# Patient Record
Sex: FEMALE | Race: WHITE | Employment: FULL TIME | URBAN - METROPOLITAN AREA
[De-identification: names, ages, dates, MRNs, and addresses within clinical notes are randomized per-mention and may not be internally consistent; named-entity substitution may affect disease eponyms.]

---

## 2017-01-10 RX ORDER — ASPIRIN 81 MG/1
81 TABLET ORAL DAILY
Qty: 90 TABLET | Refills: 3 | Status: SHIPPED | OUTPATIENT
Start: 2017-01-10 | End: 2017-11-07 | Stop reason: SDUPTHER

## 2017-03-14 ENCOUNTER — PROCEDURE VISIT (OUTPATIENT)
Dept: CARDIOLOGY CLINIC | Age: 62
End: 2017-03-14

## 2017-03-14 DIAGNOSIS — R55 SYNCOPE, UNSPECIFIED SYNCOPE TYPE: Primary | ICD-10-CM

## 2017-03-14 DIAGNOSIS — Z45.09 ENCOUNTER FOR ELECTRONIC ANALYSIS OF REVEAL EVENT RECORDER: ICD-10-CM

## 2017-03-14 PROCEDURE — 93298 REM INTERROG DEV EVAL SCRMS: CPT | Performed by: INTERNAL MEDICINE

## 2017-03-14 PROCEDURE — 93299 PR REM INTERROG ICPMS/SCRMS <30 D TECH REVIEW: CPT | Performed by: INTERNAL MEDICINE

## 2017-04-27 ENCOUNTER — PROCEDURE VISIT (OUTPATIENT)
Dept: CARDIOLOGY CLINIC | Age: 62
End: 2017-04-27

## 2017-04-27 DIAGNOSIS — R55 SYNCOPE, UNSPECIFIED SYNCOPE TYPE: Primary | ICD-10-CM

## 2017-04-27 DIAGNOSIS — Z45.09 ENCOUNTER FOR ELECTRONIC ANALYSIS OF REVEAL EVENT RECORDER: ICD-10-CM

## 2017-04-27 PROCEDURE — 93299 PR REM INTERROG ICPMS/SCRMS <30 D TECH REVIEW: CPT | Performed by: INTERNAL MEDICINE

## 2017-04-27 PROCEDURE — 93298 REM INTERROG DEV EVAL SCRMS: CPT | Performed by: INTERNAL MEDICINE

## 2017-06-12 ENCOUNTER — PROCEDURE VISIT (OUTPATIENT)
Dept: CARDIOLOGY CLINIC | Age: 62
End: 2017-06-12

## 2017-06-12 DIAGNOSIS — Z45.09 ENCOUNTER FOR ELECTRONIC ANALYSIS OF REVEAL EVENT RECORDER: ICD-10-CM

## 2017-06-12 DIAGNOSIS — R55 SYNCOPE, UNSPECIFIED SYNCOPE TYPE: Primary | ICD-10-CM

## 2017-06-12 PROCEDURE — 93299 PR REM INTERROG ICPMS/SCRMS <30 D TECH REVIEW: CPT | Performed by: INTERNAL MEDICINE

## 2017-06-12 PROCEDURE — 93298 REM INTERROG DEV EVAL SCRMS: CPT | Performed by: INTERNAL MEDICINE

## 2017-07-26 ENCOUNTER — PROCEDURE VISIT (OUTPATIENT)
Dept: CARDIOLOGY CLINIC | Age: 62
End: 2017-07-26

## 2017-07-26 DIAGNOSIS — Z45.09 ENCOUNTER FOR ELECTRONIC ANALYSIS OF REVEAL EVENT RECORDER: ICD-10-CM

## 2017-07-26 DIAGNOSIS — R55 SYNCOPE, UNSPECIFIED SYNCOPE TYPE: Primary | ICD-10-CM

## 2017-07-26 PROCEDURE — 93298 REM INTERROG DEV EVAL SCRMS: CPT | Performed by: INTERNAL MEDICINE

## 2017-07-26 PROCEDURE — 93299 PR REM INTERROG ICPMS/SCRMS <30 D TECH REVIEW: CPT | Performed by: INTERNAL MEDICINE

## 2017-07-27 ENCOUNTER — TELEPHONE (OUTPATIENT)
Dept: CARDIOLOGY CLINIC | Age: 62
End: 2017-07-27

## 2017-11-07 ENCOUNTER — OFFICE VISIT (OUTPATIENT)
Dept: CARDIOLOGY CLINIC | Age: 62
End: 2017-11-07

## 2017-11-07 VITALS
DIASTOLIC BLOOD PRESSURE: 80 MMHG | SYSTOLIC BLOOD PRESSURE: 100 MMHG | HEART RATE: 72 BPM | BODY MASS INDEX: 25.06 KG/M2 | HEIGHT: 65 IN | WEIGHT: 150.4 LBS

## 2017-11-07 DIAGNOSIS — R00.0 TACHYARRHYTHMIA: Primary | ICD-10-CM

## 2017-11-07 PROCEDURE — 99213 OFFICE O/P EST LOW 20 MIN: CPT | Performed by: INTERNAL MEDICINE

## 2017-11-07 RX ORDER — ASPIRIN 81 MG/1
81 TABLET ORAL DAILY
Qty: 90 TABLET | Refills: 3 | Status: SHIPPED | OUTPATIENT
Start: 2017-11-07 | End: 2018-10-31 | Stop reason: SDUPTHER

## 2017-11-07 RX ORDER — CELECOXIB 100 MG/1
100 CAPSULE ORAL 2 TIMES DAILY
COMMUNITY
End: 2019-11-15

## 2017-11-07 RX ORDER — MIDODRINE HYDROCHLORIDE 2.5 MG/1
2.5 TABLET ORAL 3 TIMES DAILY
Qty: 90 TABLET | Refills: 3 | Status: SHIPPED | OUTPATIENT
Start: 2017-11-07 | End: 2018-03-03 | Stop reason: SDUPTHER

## 2017-11-07 NOTE — PROGRESS NOTES
CARDIOLOGY NOTE      11/7/2017    RE: Sebastian Centeno  (1955)                               TO:    No primary care provider on file. Thank you for involving me in taking care of your  patient Sebastian Centeno, who is a  65y.o. year old      female with past medical  history of  arrythmia  is  seen today Patient  during this  visit has no cardiac complains. Vitals:    11/07/17 1328   BP: 100/80   Pulse: 72       Current Outpatient Prescriptions   Medication Sig Dispense Refill    celecoxib (CELEBREX) 100 MG capsule Take 100 mg by mouth 2 times daily      aspirin 81 MG EC tablet Take 1 tablet by mouth daily 90 tablet 3    midodrine (PROAMATINE) 2.5 MG tablet Take 1 tablet by mouth 3 times daily 90 tablet 3    ondansetron (ZOFRAN) 8 MG tablet TAKE 1 TABLET BY MOUTH EVERY 8 HOURS AS NEEDED  2    RESTASIS 0.05 % ophthalmic emulsion   3    estradiol (ESTRACE) 2 MG tablet Take 1 tablet by mouth daily. 90 tablet 3    zolpidem (AMBIEN) 10 MG tablet 5 mg nightly as needed.  Omega-3 Fatty Acids (OMEGA 3 PO) Take  by mouth.  rizatriptan (MAXALT) 10 MG tablet Take 10 mg by mouth once as needed. May repeat in 2 hours if needed       Multiple Vitamins-Minerals (CENTRUM PO) Take  by mouth.  vitamin E 1000 UNIT capsule Take 1,000 Units by mouth daily. No current facility-administered medications for this visit. Allergies: Review of patient's allergies indicates no known allergies. Past Medical History:   Diagnosis Date    ACS (acute coronary syndrome) (HCC)     Chest pain     Dizziness     H/O 24 hour EKG monitoring 11/26/2013    14 DAY EVENT MONITOR-Normal sinus rhythm    H/O cardiac catheterization 11/12/2013    no significant disease, continue risk modification.  H/O cardiovascular stress test 11/11/2013    EF 70%, mild degree of inferior wall ischemia, normal perfuison, normal LV systolic function.     H/O chest pain 11/10/13    H/O Doppler ultrasound  No respiratory distress. No wheezes or rales. Pulses: Bilateral radial and pedal pulses normal  Abdomen  no tenderness  Musculoskeletal  normal strength  Neurologic    There are  no gross focal neurologic abnormalities.   Skin-  No rash  Affect; normal mood    DATA:  Lab Results   Component Value Date    TROPONINI <0.006 11/11/2013     BNP:  No results found for: BNP  PT/INR:  No results found for: PTINR  No results found for: LABA1C  No results found for: CHOL, TRIG, HDL, LDLCALC, LDLDIRECT  Lab Results   Component Value Date    ALT 14 11/10/2013    AST 18 11/10/2013     TSH:  No results found for: TSH      QUALITY MEASURES:  CAD:  No   CHOL LOWERING:  No- if No Why  ANTIPLATELET:  No - if No why  BETA BLOCKER    no  IF  NO WHY  SMOKING HISTORY no COUNSELLED no  ATRIAL FIBRILLATIONno ANTICOAG: no    Assessment/ Plan:     Patient seen , interviewed and examined      - arrythmia  Sp ablation better    - SP  ILR  FU with EP    - hypotension on proamatine

## 2017-11-14 ENCOUNTER — TELEPHONE (OUTPATIENT)
Dept: CARDIOLOGY CLINIC | Age: 62
End: 2017-11-14

## 2017-11-14 NOTE — TELEPHONE ENCOUNTER
Spoke w/ patient scheduled her with Dr Chris Chahal per Dr Eliezer Anguiano for 12/6 @ 3 pm. Patient is out of town and if that date & time doesn't work she will call back next week.

## 2017-12-06 ENCOUNTER — INITIAL CONSULT (OUTPATIENT)
Dept: CARDIOLOGY CLINIC | Age: 62
End: 2017-12-06

## 2017-12-06 VITALS
SYSTOLIC BLOOD PRESSURE: 114 MMHG | HEIGHT: 65 IN | BODY MASS INDEX: 24.96 KG/M2 | WEIGHT: 149.8 LBS | HEART RATE: 72 BPM | OXYGEN SATURATION: 98 % | DIASTOLIC BLOOD PRESSURE: 74 MMHG

## 2017-12-06 DIAGNOSIS — R00.0 TACHYARRHYTHMIA: ICD-10-CM

## 2017-12-06 DIAGNOSIS — R00.2 PALPITATIONS: Primary | ICD-10-CM

## 2017-12-06 PROCEDURE — 99214 OFFICE O/P EST MOD 30 MIN: CPT | Performed by: INTERNAL MEDICINE

## 2017-12-06 PROCEDURE — 93000 ELECTROCARDIOGRAM COMPLETE: CPT | Performed by: INTERNAL MEDICINE

## 2017-12-06 RX ORDER — GABAPENTIN 300 MG/1
300 CAPSULE ORAL 3 TIMES DAILY
COMMUNITY

## 2017-12-06 NOTE — LETTER
[facility]) Informed Consent for Anesthesia/Sedation, Surgery, Invasive Procedures, and other High-risk Interventions and Medication use     *This consent is applicable for 30 days following patient signature*    Procedure(s)   Arianne LEE authorize, Dr. Sharan Aguiar   and the associate(s) or assistant(s) of his/her choice, to perform the following procedure(s):Electrophysiology Study/Supraventricular Tachycardia Ablation with Loop Removal possible Loop Replacement. I know that unexpected conditions may require additional or different procedures than those above. I authorize the above named practitioner(s) perform these as necessary and desirable. This is based on the practitioners professional judgment. The above named practitioner has discussed the above procedure(s) with me, including:  ? Potential benefits, including likelihood of success of the procedure(s) goals  ? Risks  ? Side effects, risk of death, and risk of infection  ? Any potential problems that might occur during recuperation or healing post-procedure  ? Reasonable alternatives  ? Risks of NOT performing the procedure(s)    I acknowledge that no warranty or guarantee has been made to the results the procedure(s). I consent to the above named practitioner(s) providing additional services to me as deemed reasonable and necessary, including but not limited to:    ? Use of medications for anesthesia or sedation. ? All anesthesia and sedation carry risks. My practitioner has discussed my anticipated anesthesia and/or sedation and the risks of using, risk of not using, benefits, side effects, and alternatives. ? Use of pathology  ? I authorize [facility]) to dispose of tissues, specimens or organs when pathology is complete. ? Use of radiology  ? A contrast agent may be required for radiology procedures.   My practitioner has advised me of the risks of using, risks of not using, 11/10/16 153 lb (69.4 kg)        Insurance: Payor: [facility] / Plan: [facility] [place] PPO / Product Type: *No Product type* /     Date of Procedure: 12/27/17 Time: 1:00 Arrival Time: 11:00    Diagnosis:Supraventricular Tachycardia Ablation  Allergies: No Known Allergies     1) Call [facility] scheduling ([phone]) or Instant Message  CONFIRMED WITH     PHONE OR   INSTANT MESSAGE  2) PREAUTHORIZATION NUMBER:    Spoke to:      From date:     expiration date:        Salvatore Estrada

## 2017-12-06 NOTE — PROGRESS NOTES
Electrophysiology FU Note      Chief complaint :  pALPITATIONS    Referring physician:  Chayo Boudreaux      Primary care physician: No primary care provider on file. History of Present Illness: This visit (12/6/2017)      Chief Complaint   Patient presents with    Tachycardia     Ak-tachycardia. Patient denies CP, SOB, dizziness, or edema. Does report palpitations at times, but thinks it is due to coffee. She says they come and go very quickly. Patient has a loop and states she thinks it is no longer working or that the battery is dead and she states Dr Winsome Beckman wanted her to see Dr Tawana Grayson to see what he wanted to do. Previous visit (10/21/2016)      Chief Complaint   Patient presents with    Shortness of Breath     Patient states that for the last couple of months she has been having shortness of breath and feeling very fatigued    Other     patient states that her blood pressure has been low, line around 80's/50's. She says that her heartrate has been all over the place.  Other     patient states that she gets this tingling in her hands and in her head. She also states that she gets these dull headaches. Previous visit: (6/3/2016)      Chief Complaint   Patient presents with    3 Month Follow-Up     Patient is here for 3 month OV, she states that she was started on Multaq at last office visit. Pt states she is a little tired and sluggish but thinks they may be from medication. Pt denies any chest pain, palpitations, dizziness SOB or edema.  Atrial Fibrillation             Past Medical History:   Diagnosis Date    ACS (acute coronary syndrome) (HCC)     Chest pain     Dizziness     H/O 24 hour EKG monitoring 11/26/2013    14 DAY EVENT MONITOR-Normal sinus rhythm    H/O cardiac catheterization 11/12/2013    no significant disease, continue risk modification.     H/O cardiovascular stress test 11/11/2013    EF 70%, mild degree of inferior wall ischemia, normal perfuison, normal LV systolic function.  H/O chest pain 11/10/13    H/O Doppler ultrasound 3/13/2014    peripheral arterial duplex - no significant stenosis, suggest nonvascular workup    H/O echocardiogram 11/11/2013    EF 55-60%, mild mitral and tricuspid insufficiencies, normal LV systolic function, no pericardial effusion.  H/O echocardiogram 11/10/2016    EF 60% Mild Mitral and Tricuspid insuff.  H/O pulmonary function tests 11/27/2013    History of exercise stress test 11/10/2016    treadmill    Hormone disorder     Migraine     Migraines     Near syncope     Palpitations     Post-menopause     Tachyarrhythmia     Urinary incontinence        Past Surgical History:   Procedure Laterality Date    CARDIAC SURGERY      Heart cath    COLONOSCOPY      ELBOW SURGERY      EYE SURGERY      HYSTERECTOMY  1990    endometriosis    TUBAL LIGATION         Family History   Problem Relation Age of Onset    Diabetes Mother     Hypertension Mother     Heart Disease Mother     High Blood Pressure Mother     Heart Disease Sister         reports that she has never smoked. She has never used smokeless tobacco. She reports that she drinks about 0.6 oz of alcohol per week . She reports that she does not use drugs. No Known Allergies    Current Outpatient Prescriptions   Medication Sig Dispense Refill    gabapentin (NEURONTIN) 300 MG capsule Take 300 mg by mouth 3 times daily      celecoxib (CELEBREX) 100 MG capsule Take 100 mg by mouth 2 times daily      midodrine (PROAMATINE) 2.5 MG tablet Take 1 tablet by mouth 3 times daily 90 tablet 3    aspirin 81 MG EC tablet Take 1 tablet by mouth daily 90 tablet 3    ondansetron (ZOFRAN) 8 MG tablet TAKE 1 TABLET BY MOUTH EVERY 8 HOURS AS NEEDED  2    RESTASIS 0.05 % ophthalmic emulsion   3    estradiol (ESTRACE) 2 MG tablet Take 1 tablet by mouth daily.  90 tablet 3    zolpidem (AMBIEN) 10 MG tablet 5 mg nightly as needed.  Omega-3 Fatty Acids (OMEGA 3 PO) Take  by mouth.  rizatriptan (MAXALT) 10 MG tablet Take 10 mg by mouth once as needed. May repeat in 2 hours if needed       Multiple Vitamins-Minerals (CENTRUM PO) Take  by mouth.  vitamin E 1000 UNIT capsule Take 1,000 Units by mouth daily. No current facility-administered medications for this visit. Review of Systems:   Review of Systems   Constitutional:D tired and weak Negative for fever, chills and activity change. HENT: Negative for congestion, ear pain and tinnitus. Eyes: Negative for photophobia, pain and visual disturbance. Respiratory: . Negative for cough, chest tightness and wheezing. Cardiovascular: Shortness of breath. palpitations now Negative for chest pain and leg swelling. Gastrointestinal: Negative for nausea, vomiting, abdominal pain, diarrhea, constipation and blood in stool. Endocrine: Negative for cold intolerance and heat intolerance. Genitourinary: Negative for dysuria, hematuria and flank pain. Musculoskeletal: Positive for back pain and arthralgias. Negative for myalgias and neck stiffness. Skin: Negative for color change and rash. Allergic/Immunologic: Negative for food allergies. Neurological: Negative for dizziness or syncope Negative for numbness and headaches. Hematological: Does not bruise/bleed easily. Psychiatric/Behavioral: Negative for behavioral problems, confusion and agitation. Physical Examination:    /74   Pulse 72   Ht 5' 5\" (1.651 m)   Wt 149 lb 12.8 oz (67.9 kg)   SpO2 98%   BMI 24.93 kg/m²    Wt Readings from Last 3 Encounters:   12/06/17 149 lb 12.8 oz (67.9 kg)   11/07/17 150 lb 6.4 oz (68.2 kg)   11/10/16 153 lb (69.4 kg)     Body mass index is 24.93 kg/m². Physical Exam   Constitutional: She is oriented to person, place, and time and well-developed, well-nourished, and in no distress. HENT:   Head: Normocephalic and atraumatic.    Eyes: battery is dead    More palpitations last few weeks and battery is dead so we cannot see the arrhythmia        Cheryl Whitmore MD

## 2017-12-06 NOTE — PROGRESS NOTES
Subjective:      Patient ID: Frances Torres is a 65 y.o. female.     HPI    Review of Systems    Objective:   Physical Exam    Assessment:      ***      Plan:      ***

## 2017-12-07 ENCOUNTER — TELEPHONE (OUTPATIENT)
Dept: CARDIOLOGY CLINIC | Age: 62
End: 2017-12-07

## 2017-12-20 ENCOUNTER — TELEPHONE (OUTPATIENT)
Dept: CARDIOLOGY CLINIC | Age: 62
End: 2017-12-20

## 2017-12-22 ENCOUNTER — HOSPITAL ENCOUNTER (OUTPATIENT)
Dept: LAB | Age: 62
Discharge: HOME | End: 2017-12-22
Attending: INTERNAL MEDICINE | Admitting: INTERNAL MEDICINE

## 2017-12-22 DIAGNOSIS — Z01.811 PRE-OP CHEST EXAM: ICD-10-CM

## 2017-12-22 LAB
ANION GAP SERPL CALCULATED.3IONS-SCNC: 11 MMOL/L (ref 4–16)
APTT: 29.4 SECONDS (ref 21.2–33)
BUN BLDV-MCNC: 11 MG/DL (ref 6–23)
CALCIUM SERPL-MCNC: 9.4 MG/DL (ref 8.3–10.6)
CHLORIDE BLD-SCNC: 100 MMOL/L (ref 99–110)
CO2: 27 MMOL/L (ref 21–32)
CREAT SERPL-MCNC: 0.9 MG/DL (ref 0.6–1.1)
GFR AFRICAN AMERICAN: >60 ML/MIN/1.73M2
GFR NON-AFRICAN AMERICAN: >60 ML/MIN/1.73M2
GLUCOSE BLD-MCNC: 79 MG/DL (ref 70–99)
HCT VFR BLD CALC: 38.4 % (ref 37–47)
HEMOGLOBIN: 12.4 GM/DL (ref 12.5–16)
INR BLD: 0.96 INDEX
MAGNESIUM: 1.8 MG/DL (ref 1.8–2.4)
MCH RBC QN AUTO: 30.3 PG (ref 27–31)
MCHC RBC AUTO-ENTMCNC: 32.3 % (ref 32–36)
MCV RBC AUTO: 93.9 FL (ref 78–100)
PDW BLD-RTO: 13.8 % (ref 11.7–14.9)
PHOSPHORUS: 2.4 MG/DL (ref 2.5–4.9)
PLATELET # BLD: 360 K/CU MM (ref 140–440)
PMV BLD AUTO: 9.8 FL (ref 7.5–11.1)
POTASSIUM SERPL-SCNC: 4.2 MMOL/L (ref 3.5–5.1)
PROTHROMBIN TIME: 11 SECONDS (ref 9.12–12.5)
RBC # BLD: 4.09 M/CU MM (ref 4.2–5.4)
SODIUM BLD-SCNC: 138 MMOL/L (ref 135–145)
WBC # BLD: 11.3 K/CU MM (ref 4–10.5)

## 2018-01-12 ENCOUNTER — OFFICE VISIT (OUTPATIENT)
Dept: CARDIOLOGY CLINIC | Age: 63
End: 2018-01-12

## 2018-01-12 VITALS
BODY MASS INDEX: 25.12 KG/M2 | HEIGHT: 65 IN | DIASTOLIC BLOOD PRESSURE: 80 MMHG | HEART RATE: 72 BPM | WEIGHT: 150.8 LBS | SYSTOLIC BLOOD PRESSURE: 118 MMHG

## 2018-01-12 DIAGNOSIS — R00.2 PALPITATIONS: Primary | ICD-10-CM

## 2018-01-12 DIAGNOSIS — R00.0 TACHYARRHYTHMIA: ICD-10-CM

## 2018-01-12 PROCEDURE — 93000 ELECTROCARDIOGRAM COMPLETE: CPT | Performed by: INTERNAL MEDICINE

## 2018-01-12 PROCEDURE — 99212 OFFICE O/P EST SF 10 MIN: CPT | Performed by: INTERNAL MEDICINE

## 2018-01-12 NOTE — PROGRESS NOTES
Electrophysiology FU Note      Chief complaint :  pALPITATIONS    Referring physician:  Eris Man      Primary care physician: Renato Zuniga MD      History of Present Illness: This visit (1/12/2018)      Chief Complaint   Patient presents with    Atrial Fibrillation     Ak-F/U EP Study/SVT abl(not done) and loop removal 12/27. Patient CP, SOB, dizziness, or edema. Patient states loop removal site seems to be healing fine, steri strips still in place. Also reports a \"flutter\" every so often that will last for a few seconds. Previous visit: (12/6/2017)      Chief Complaint   Patient presents with    Tachycardia     Ak-tachycardia. Patient denies CP, SOB, dizziness, or edema. Does report palpitations at times, but thinks it is due to coffee. She says they come and go very quickly. Patient has a loop and states she thinks it is no longer working or that the battery is dead and she states Dr Rolin Nissen wanted her to see Dr Ellyn Lacy to see what he wanted to do. Previous visit (10/21/2016)      Chief Complaint   Patient presents with    Shortness of Breath     Patient states that for the last couple of months she has been having shortness of breath and feeling very fatigued    Other     patient states that her blood pressure has been low, line around 80's/50's. She says that her heartrate has been all over the place.  Other     patient states that she gets this tingling in her hands and in her head. She also states that she gets these dull headaches. Previous visit: (6/3/2016)      Chief Complaint   Patient presents with    3 Month Follow-Up     Patient is here for 3 month OV, she states that she was started on Multaq at last office visit. Pt states she is a little tired and sluggish but thinks they may be from medication. Pt denies any chest pain, palpitations, dizziness SOB or edema.      Atrial Fibrillation             Past Medical History:   Diagnosis Date    ACS (acute coronary syndrome) (HCC)     Chest pain     Dizziness     H/O 24 hour EKG monitoring 11/26/2013    14 DAY EVENT MONITOR-Normal sinus rhythm    H/O cardiac catheterization 11/12/2013    no significant disease, continue risk modification.  H/O cardiovascular stress test 11/11/2013    EF 70%, mild degree of inferior wall ischemia, normal perfuison, normal LV systolic function.  H/O chest pain 11/10/13    H/O Doppler ultrasound 3/13/2014    peripheral arterial duplex - no significant stenosis, suggest nonvascular workup    H/O echocardiogram 11/11/2013    EF 55-60%, mild mitral and tricuspid insufficiencies, normal LV systolic function, no pericardial effusion.  H/O echocardiogram 11/10/2016    EF 60% Mild Mitral and Tricuspid insuff.  H/O pulmonary function tests 11/27/2013    History of exercise stress test 11/10/2016    treadmill    Hormone disorder     Migraine     Migraines     Near syncope     Palpitations     Post-menopause     Tachyarrhythmia     Urinary incontinence        Past Surgical History:   Procedure Laterality Date    CARDIAC SURGERY      Heart cath    COLONOSCOPY      ELBOW SURGERY      EYE SURGERY      HYSTERECTOMY  1990    endometriosis    TUBAL LIGATION         Family History   Problem Relation Age of Onset    Diabetes Mother     Hypertension Mother     Heart Disease Mother     High Blood Pressure Mother     Heart Disease Sister         reports that she has never smoked. She has never used smokeless tobacco. She reports that she does not drink alcohol or use drugs.     No Known Allergies    Current Outpatient Prescriptions   Medication Sig Dispense Refill    benzonatate (TESSALON PERLES) 100 MG capsule Take 100 mg by mouth 3 times daily as needed for Cough      gabapentin (NEURONTIN) 300 MG capsule Take 300 mg by mouth 3 times daily      celecoxib (CELEBREX) 100 MG capsule Take 100 mg by mouth 2 times daily      midodrine  3.  AVNRT sp ablation of slow pathway    Occasional fluttering  No arrhythmia on EP study  Recommend to avoid caffeine and alcohol (commended for quit alcohol)    Loop removal site healed well  FU with Gissel Gonzalez MD

## 2018-03-06 RX ORDER — MIDODRINE HYDROCHLORIDE 2.5 MG/1
2.5 TABLET ORAL 3 TIMES DAILY
Qty: 90 TABLET | Refills: 6 | Status: SHIPPED | OUTPATIENT
Start: 2018-03-06 | End: 2018-10-31 | Stop reason: SDUPTHER

## 2018-10-31 ENCOUNTER — OFFICE VISIT (OUTPATIENT)
Dept: CARDIOLOGY CLINIC | Age: 63
End: 2018-10-31
Payer: COMMERCIAL

## 2018-10-31 VITALS
WEIGHT: 157.8 LBS | DIASTOLIC BLOOD PRESSURE: 80 MMHG | HEART RATE: 64 BPM | SYSTOLIC BLOOD PRESSURE: 110 MMHG | BODY MASS INDEX: 26.29 KG/M2 | HEIGHT: 65 IN

## 2018-10-31 DIAGNOSIS — I73.9 CLAUDICATION (HCC): ICD-10-CM

## 2018-10-31 DIAGNOSIS — Z86.79 S/P CATHETER ABLATION OF SLOW PATHWAY: ICD-10-CM

## 2018-10-31 DIAGNOSIS — Z98.890 S/P CATHETER ABLATION OF SLOW PATHWAY: ICD-10-CM

## 2018-10-31 DIAGNOSIS — R00.0 TACHYARRHYTHMIA: Primary | ICD-10-CM

## 2018-10-31 PROCEDURE — 99213 OFFICE O/P EST LOW 20 MIN: CPT | Performed by: NURSE PRACTITIONER

## 2018-10-31 RX ORDER — SIMVASTATIN 40 MG
40 TABLET ORAL NIGHTLY
COMMUNITY

## 2018-10-31 RX ORDER — ASPIRIN 81 MG/1
81 TABLET ORAL DAILY
Qty: 90 TABLET | Refills: 3 | Status: SHIPPED | OUTPATIENT
Start: 2018-10-31 | End: 2019-11-15 | Stop reason: SDUPTHER

## 2018-10-31 RX ORDER — MIDODRINE HYDROCHLORIDE 2.5 MG/1
2.5 TABLET ORAL 3 TIMES DAILY
Qty: 270 TABLET | Refills: 3 | Status: SHIPPED | OUTPATIENT
Start: 2018-10-31 | End: 2019-10-15 | Stop reason: SDUPTHER

## 2018-10-31 NOTE — PATIENT INSTRUCTIONS
Please remember to bring all medication bottles or a medication list with you to your appointment. If you have any questions, please call our office at [phone].

## 2019-08-09 ENCOUNTER — TELEPHONE (OUTPATIENT)
Dept: CARDIOLOGY CLINIC | Age: 64
End: 2019-08-09

## 2019-10-15 RX ORDER — MIDODRINE HYDROCHLORIDE 2.5 MG/1
2.5 TABLET ORAL 3 TIMES DAILY
Qty: 270 TABLET | Refills: 3 | Status: SHIPPED | OUTPATIENT
Start: 2019-10-15 | End: 2019-11-15

## 2019-11-15 ENCOUNTER — OFFICE VISIT (OUTPATIENT)
Dept: CARDIOLOGY CLINIC | Age: 64
End: 2019-11-15
Payer: COMMERCIAL

## 2019-11-15 VITALS
DIASTOLIC BLOOD PRESSURE: 82 MMHG | WEIGHT: 154.6 LBS | SYSTOLIC BLOOD PRESSURE: 112 MMHG | HEIGHT: 65 IN | HEART RATE: 80 BPM | BODY MASS INDEX: 25.76 KG/M2

## 2019-11-15 DIAGNOSIS — Z86.79 S/P CATHETER ABLATION OF SLOW PATHWAY: ICD-10-CM

## 2019-11-15 DIAGNOSIS — I24.9 ACS (ACUTE CORONARY SYNDROME) (HCC): Primary | ICD-10-CM

## 2019-11-15 DIAGNOSIS — R00.0 TACHYARRHYTHMIA: ICD-10-CM

## 2019-11-15 DIAGNOSIS — Z98.890 S/P CATHETER ABLATION OF SLOW PATHWAY: ICD-10-CM

## 2019-11-15 PROCEDURE — 93000 ELECTROCARDIOGRAM COMPLETE: CPT | Performed by: NURSE PRACTITIONER

## 2019-11-15 PROCEDURE — 99214 OFFICE O/P EST MOD 30 MIN: CPT | Performed by: NURSE PRACTITIONER

## 2019-11-15 RX ORDER — ASPIRIN 81 MG/1
81 TABLET ORAL DAILY
Qty: 90 TABLET | Refills: 3 | Status: SHIPPED | OUTPATIENT
Start: 2019-11-15

## 2019-11-15 RX ORDER — MIDODRINE HYDROCHLORIDE 2.5 MG/1
2.5 TABLET ORAL 2 TIMES DAILY
Qty: 180 TABLET | Refills: 3
Start: 2019-11-15

## 2020-12-04 ENCOUNTER — OFFICE VISIT (OUTPATIENT)
Dept: CARDIOLOGY CLINIC | Age: 65
End: 2020-12-04
Payer: COMMERCIAL

## 2020-12-04 VITALS
SYSTOLIC BLOOD PRESSURE: 120 MMHG | HEART RATE: 68 BPM | DIASTOLIC BLOOD PRESSURE: 80 MMHG | BODY MASS INDEX: 26.17 KG/M2 | HEIGHT: 65 IN | WEIGHT: 157.1 LBS

## 2020-12-04 PROBLEM — U07.1 2019 NOVEL CORONAVIRUS DISEASE (COVID-19): Status: ACTIVE | Noted: 2020-12-04

## 2020-12-04 PROCEDURE — 99213 OFFICE O/P EST LOW 20 MIN: CPT | Performed by: NURSE PRACTITIONER

## 2020-12-04 ASSESSMENT — ENCOUNTER SYMPTOMS
NAUSEA: 0
DIARRHEA: 0
COUGH: 0
COLOR CHANGE: 0
BLOOD IN STOOL: 0
SHORTNESS OF BREATH: 0
PHOTOPHOBIA: 0
SINUS PAIN: 0
ABDOMINAL PAIN: 0
CONSTIPATION: 0
VOMITING: 0

## 2020-12-04 NOTE — ASSESSMENT & PLAN NOTE
 Patient had stents placed / CABG.  [facility] 2013 did not show significant CAD   Patient is not on BB due to bradycardia    Low salt, Low cholesterol diet   Last stress test in 2016 was normal

## 2020-12-04 NOTE — LETTER
Zahra Buchanan Bodily  1955  [id number]    Have you had any Chest Pain that is not new? - No      ? DO EKG IF: Patient has a Heart Rate above 100 or below 40     CAD (Coronary Artery Disease) patient should have one on file every 6 months        Have you had any Shortness of Breath - No      Have you had any dizziness - No      ? Sitting wait 5 minutes do supine (laying down) wait 5 minutes then do standing - log each in \"vitals\" area in Epic  ? Be sure to ask what symptoms they are having if they get dizzy while completing ortho stats such as room spinning, nausea, etc.    Have you had any palpitations that are not new?  - No      Is the patient on any of the following medications -     Do you have any edema - swelling in No        Do you have a surgery or procedure scheduled in the near future - No

## 2020-12-04 NOTE — PROGRESS NOTES
[address], [address] [facility],Third Floor  Phone: [phone]    Fax [phone]                  Ivett Vazquez MD, Jackson Asif MD, [address] [facility], MD, Noemi Skiff, MD Delfin Salters, MD Nathalie Goring, MD Nunzio Fortune, MD López Arora, APRN      Fannie Bunch, APRBRISA Mas, APRBRISA Daniel, APRN    CARDIOLOGY  NOTE      12/4/2020    RE: Lilliana Zavala  (1955)                               TO:  Dr. Nick, APRN - CNP  The primary cardiologist is Dr. Emilee Holbrook    CC:   1. ACS (acute coronary syndrome) ([facility] [address]    2. Tachyarrhythmia    3. 2019 novel coronavirus disease (COVID-19)      Patient denies all of the following:  Chest Pain  Palpitations  Shortness of Breath  Edema  Dizziness  Syncope      HPI: Thank you for involving me in taking care of your patient Lilliana Zavala, who is a  60y.o. year old female with a history as listed above. Patient is  active and does not exercise regularly. Patient is  compliant with her medications. Patient denies any cardiac complaints or needs. Vitals:    12/04/20 1407   BP: 120/80   Pulse: 68       Current Outpatient Medications   Medication Sig Dispense Refill    aspirin 81 MG EC tablet Take 1 tablet by mouth daily 90 tablet 3    midodrine (PROAMATINE) 2.5 MG tablet Take 1 tablet by mouth 2 times daily 180 tablet 3    simvastatin (ZOCOR) 40 MG tablet Take 40 mg by mouth nightly      gabapentin (NEURONTIN) 300 MG capsule Take 300 mg by mouth 3 times daily      ondansetron (ZOFRAN) 8 MG tablet TAKE 1 TABLET BY MOUTH EVERY 8 HOURS AS NEEDED  2    RESTASIS 0.05 % ophthalmic emulsion   3    estradiol (ESTRACE) 2 MG tablet Take 1 tablet by mouth daily. 90 tablet 3    zolpidem (AMBIEN) 10 MG tablet 5 mg nightly as needed.  Omega-3 Fatty Acids (OMEGA 3 PO) Take  by mouth.  rizatriptan (MAXALT) 10 MG tablet Take 10 mg by mouth once as needed.  May repeat in 2 hours if needed       Multiple Vitamins-Minerals (CENTRUM PO) Take  by mouth.  vitamin E 1000 UNIT capsule Take 1,000 Units by mouth daily. No current facility-administered medications for this visit. Allergies: Patient has no known allergies. Past Medical History:   Diagnosis Date    ACS (acute coronary syndrome) (HCC)     Chest pain     Dizziness     H/O 24 hour EKG monitoring 11/26/2013    14 DAY EVENT MONITOR-Normal sinus rhythm    H/O cardiac catheterization 11/12/2013    no significant disease, continue risk modification.  H/O cardiovascular stress test 11/11/2013    EF 70%, mild degree of inferior wall ischemia, normal perfuison, normal LV systolic function.  H/O chest pain 11/10/13    H/O Doppler ultrasound 3/13/2014    peripheral arterial duplex - no significant stenosis, suggest nonvascular workup    H/O echocardiogram 11/11/2013    EF 55-60%, mild mitral and tricuspid insufficiencies, normal LV systolic function, no pericardial effusion.  H/O echocardiogram 11/10/2016    EF 60% Mild Mitral and Tricuspid insuff.     H/O pulmonary function tests 11/27/2013    History of exercise stress test 11/10/2016    treadmill    Hormone disorder     Migraine     Migraines     Near syncope     Palpitations     Post-menopause     Tachyarrhythmia     Urinary incontinence      Past Surgical History:   Procedure Laterality Date    CARDIAC SURGERY      Heart cath    COLONOSCOPY      ELBOW SURGERY      EYE SURGERY      HYSTERECTOMY  1990    endometriosis    TUBAL LIGATION       Family History   Problem Relation Age of Onset    Diabetes Mother     Hypertension Mother     Heart Disease Mother     High Blood Pressure Mother     Heart Disease Sister      Social History     Tobacco Use    Smoking status: Never Smoker    Smokeless tobacco: Never Used   Substance Use Topics    Alcohol use: No     Alcohol/week: 1.0 standard drinks     Types: 1 Glasses of wine per week Comment: rarely. 2 cups of coffee\"        Review of Systems   Constitutional: Negative for fatigue and fever. HENT: Negative for ear pain and sinus pain. Eyes: Negative for photophobia and visual disturbance. Respiratory: Negative for cough and shortness of breath. Cardiovascular: Negative for chest pain, palpitations and leg swelling. Gastrointestinal: Negative for abdominal pain, blood in stool, constipation, diarrhea, nausea and vomiting. Endocrine: Negative for polyphagia and polyuria. Genitourinary: Negative for decreased urine volume and difficulty urinating. Musculoskeletal: Negative for arthralgias and gait problem. Skin: Negative for color change and wound. Neurological: Negative for dizziness, syncope, weakness, light-headedness and headaches. Psychiatric/Behavioral: Negative for agitation. The patient is not hyperactive. Objective:      Physical Exam:  /80   Pulse 68   Ht 5' 5\" (1.651 m)   Wt 157 lb 1.6 oz (71.3 kg)   BMI 26.14 kg/m²   Wt Readings from Last 3 Encounters:   12/04/20 157 lb 1.6 oz (71.3 kg)   11/15/19 154 lb 9.6 oz (70.1 kg)   10/31/18 157 lb 12.8 oz (71.6 kg)     Body mass index is 26.14 kg/m². Physical Exam  Vitals signs reviewed. Constitutional:       General: She is not in acute distress. Appearance: Normal appearance. She is not ill-appearing. HENT:      Head: Normocephalic and atraumatic. Eyes:      Conjunctiva/sclera: Conjunctivae normal.      Pupils: Pupils are equal, round, and reactive to light. Neck:      Musculoskeletal: Neck supple. No muscular tenderness. Vascular: No carotid bruit. Cardiovascular:      Rate and Rhythm: Normal rate and regular rhythm. Pulses: Normal pulses. Heart sounds: Normal heart sounds. No murmur. Pulmonary:      Effort: Pulmonary effort is normal. No respiratory distress. Breath sounds: Normal breath sounds. Musculoskeletal:         General: No swelling or deformity. Skin:     General: Skin is warm and dry. Capillary Refill: Capillary refill takes less than 2 seconds. Neurological:      Mental Status: She is alert and oriented to person, place, and time. Psychiatric:         Mood and Affect: Mood normal.         Behavior: Behavior normal.         Thought Content: Thought content normal.         Judgment: Judgment normal.         DATA:  Lab Results   Component Value Date    TROPONINI <0.006 11/11/2013     BNP:  No results found for: BNP  PT/INR:  No results found for: PTINR  No results found for: LABA1C  No results found for: CHOL, TRIG, HDL, LDLCALC, LDLDIRECT  Lab Results   Component Value Date    ALT 14 11/10/2013    AST 18 11/10/2013     TSH:  No results found for: TSH      Assessment/ Plan:     ACS (acute coronary syndrome) ([facility] [address]   Patient had stents placed / CABG. [facility] 2013 did not show significant CAD   Patient is not on BB due to bradycardia    Low salt, Low cholesterol diet   Last stress test in 2016 was normal       Tachyarrhythmia  HR has been well controlled. Sp ablation of accessory pathway 2015  Loop recorder removed during ablation      2019 novel coronavirus disease (COVID-19)  Positive 11/11/2020  Feeling much better. Denies edema, orthopnea       Patient seen, interviewed and examined. Testing was reviewed    Patient is encouraged to exercise even a brisk walk for 30 minutes at least 3 to 4 times a week. Lifestyle and risk factor modificatons discussed. Various goals are discussed and questions answered. Continue current medications. Appropriate prescriptions are addressed. Questions answered and patient verbalizes understanding. Call for any problems, questions, or concerns. Pt is to follow up in 12 months for Cardiac management    I have spent 15 minutes of face to face time with the patient with more than 50% spent counseling and coordinating care for ACS, Tachyarrhythmia, COVID 19 infection, and follow up plan of care. Electronically signed by RO Ramirez CNP on 12/4/2020 at 2:38 PM

## 2020-12-04 NOTE — ASSESSMENT & PLAN NOTE
HR has been well controlled.    Sp ablation of accessory pathway 2015  Loop recorder removed during ablation

## 2021-11-29 ENCOUNTER — TELEPHONE (OUTPATIENT)
Dept: CARDIOLOGY CLINIC | Age: 66
End: 2021-11-29